# Patient Record
Sex: FEMALE | Race: WHITE
[De-identification: names, ages, dates, MRNs, and addresses within clinical notes are randomized per-mention and may not be internally consistent; named-entity substitution may affect disease eponyms.]

---

## 2018-02-28 ENCOUNTER — HOSPITAL ENCOUNTER (INPATIENT)
Dept: HOSPITAL 62 - LR | Age: 33
LOS: 3 days | Discharge: HOME | End: 2018-03-03
Attending: OBSTETRICS & GYNECOLOGY | Admitting: OBSTETRICS & GYNECOLOGY
Payer: COMMERCIAL

## 2018-02-28 ENCOUNTER — HOSPITAL ENCOUNTER (OUTPATIENT)
Dept: HOSPITAL 62 - LC | Age: 33
Discharge: HOME | End: 2018-02-28
Attending: OBSTETRICS & GYNECOLOGY
Payer: COMMERCIAL

## 2018-02-28 DIAGNOSIS — Z3A.39: ICD-10-CM

## 2018-02-28 DIAGNOSIS — O24.419: Primary | ICD-10-CM

## 2018-02-28 DIAGNOSIS — O48.0: ICD-10-CM

## 2018-02-28 DIAGNOSIS — Z3A.40: ICD-10-CM

## 2018-02-28 DIAGNOSIS — Z88.1: ICD-10-CM

## 2018-02-28 LAB
ADD MANUAL DIFF: NO
ALBUMIN SERPL-MCNC: 3.1 G/DL (ref 3.5–5)
ALP SERPL-CCNC: 99 U/L (ref 38–126)
ALT SERPL-CCNC: 36 U/L (ref 9–52)
ANION GAP SERPL CALC-SCNC: 7 MMOL/L (ref 5–19)
APPEARANCE UR: (no result)
APPEARANCE UR: (no result)
APTT PPP: YELLOW S
APTT PPP: YELLOW S
AST SERPL-CCNC: 26 U/L (ref 14–36)
BARBITURATES UR QL SCN: NEGATIVE
BARBITURATES UR QL SCN: NEGATIVE
BASOPHILS # BLD AUTO: 0 10^3/UL (ref 0–0.2)
BASOPHILS NFR BLD AUTO: 0.2 % (ref 0–2)
BILIRUB DIRECT SERPL-MCNC: 0.2 MG/DL (ref 0–0.4)
BILIRUB SERPL-MCNC: 0.3 MG/DL (ref 0.2–1.3)
BILIRUB UR QL STRIP: NEGATIVE
BILIRUB UR QL STRIP: NEGATIVE
BUN SERPL-MCNC: 7 MG/DL (ref 7–20)
CALCIUM: 8.8 MG/DL (ref 8.4–10.2)
CHLORIDE SERPL-SCNC: 108 MMOL/L (ref 98–107)
CO2 SERPL-SCNC: 22 MMOL/L (ref 22–30)
CREAT UR-MCNC: 86.3 MG/DL (ref 16–327)
EOSINOPHIL # BLD AUTO: 0 10^3/UL (ref 0–0.6)
EOSINOPHIL NFR BLD AUTO: 0 % (ref 0–6)
ERYTHROCYTE [DISTWIDTH] IN BLOOD BY AUTOMATED COUNT: 12.8 % (ref 11.5–14)
ERYTHROCYTE [DISTWIDTH] IN BLOOD BY AUTOMATED COUNT: 12.9 % (ref 11.5–14)
GLUCOSE SERPL-MCNC: 87 MG/DL (ref 75–110)
GLUCOSE UR STRIP-MCNC: NEGATIVE MG/DL
GLUCOSE UR STRIP-MCNC: NEGATIVE MG/DL
HCT VFR BLD CALC: 37.2 % (ref 36–47)
HCT VFR BLD CALC: 37.5 % (ref 36–47)
HGB BLD-MCNC: 13 G/DL (ref 12–15.5)
HGB BLD-MCNC: 13 G/DL (ref 12–15.5)
KETONES UR STRIP-MCNC: 20 MG/DL
KETONES UR STRIP-MCNC: NEGATIVE MG/DL
LDH1 SERPL-CCNC: 515 U/L (ref 313–618)
LYMPHOCYTES # BLD AUTO: 1.2 10^3/UL (ref 0.5–4.7)
LYMPHOCYTES NFR BLD AUTO: 10.7 % (ref 13–45)
MCH RBC QN AUTO: 31.3 PG (ref 27–33.4)
MCH RBC QN AUTO: 31.7 PG (ref 27–33.4)
MCHC RBC AUTO-ENTMCNC: 34.8 G/DL (ref 32–36)
MCHC RBC AUTO-ENTMCNC: 34.9 G/DL (ref 32–36)
MCV RBC AUTO: 90 FL (ref 80–97)
MCV RBC AUTO: 91 FL (ref 80–97)
METHADONE UR QL SCN: NEGATIVE
METHADONE UR QL SCN: NEGATIVE
MONOCYTES # BLD AUTO: 0.6 10^3/UL (ref 0.1–1.4)
MONOCYTES NFR BLD AUTO: 6 % (ref 3–13)
NEUTROPHILS # BLD AUTO: 9 10^3/UL (ref 1.7–8.2)
NEUTS SEG NFR BLD AUTO: 83.1 % (ref 42–78)
NITRITE UR QL STRIP: NEGATIVE
NITRITE UR QL STRIP: NEGATIVE
PCP UR QL SCN: NEGATIVE
PCP UR QL SCN: NEGATIVE
PH UR STRIP: 6 [PH] (ref 5–9)
PH UR STRIP: 6 [PH] (ref 5–9)
PLATELET # BLD: 175 10^3/UL (ref 150–450)
PLATELET # BLD: 182 10^3/UL (ref 150–450)
POTASSIUM SERPL-SCNC: 3.6 MMOL/L (ref 3.6–5)
PROT SERPL-MCNC: 5.6 G/DL (ref 6.3–8.2)
PROT UR STRIP-MCNC: 100 MG/DL
PROT UR STRIP-MCNC: 11.6 MG/DL (ref ?–12)
PROT UR STRIP-MCNC: NEGATIVE MG/DL
RBC # BLD AUTO: 4.1 10^6/UL (ref 3.72–5.28)
RBC # BLD AUTO: 4.17 10^6/UL (ref 3.72–5.28)
SODIUM SERPL-SCNC: 137.1 MMOL/L (ref 137–145)
SP GR UR STRIP: 1.01
SP GR UR STRIP: 1.01
TOTAL CELLS COUNTED % (AUTO): 100 %
UR PRO/CREAT RATIO RESULT: 0.1 MG/MG (ref 0–0.2)
URATE SERPL-MCNC: 4.3 MG/DL (ref 2.5–6.2)
URINE AMPHETAMINES SCREEN: NEGATIVE
URINE AMPHETAMINES SCREEN: NEGATIVE
URINE BENZODIAZEPINES SCREEN: NEGATIVE
URINE BENZODIAZEPINES SCREEN: NEGATIVE
URINE COCAINE SCREEN: NEGATIVE
URINE COCAINE SCREEN: NEGATIVE
URINE MARIJUANA (THC) SCREEN: NEGATIVE
URINE MARIJUANA (THC) SCREEN: NEGATIVE
UROBILINOGEN UR-MCNC: NEGATIVE MG/DL (ref ?–2)
UROBILINOGEN UR-MCNC: NEGATIVE MG/DL (ref ?–2)
WBC # BLD AUTO: 10.9 10^3/UL (ref 4–10.5)
WBC # BLD AUTO: 11 10^3/UL (ref 4–10.5)

## 2018-02-28 PROCEDURE — 81005 URINALYSIS: CPT

## 2018-02-28 PROCEDURE — 4A1HXCZ MONITORING OF PRODUCTS OF CONCEPTION, CARDIAC RATE, EXTERNAL APPROACH: ICD-10-PCS | Performed by: OBSTETRICS & GYNECOLOGY

## 2018-02-28 PROCEDURE — 85025 COMPLETE CBC W/AUTO DIFF WBC: CPT

## 2018-02-28 PROCEDURE — 86901 BLOOD TYPING SEROLOGIC RH(D): CPT

## 2018-02-28 PROCEDURE — 80053 COMPREHEN METABOLIC PANEL: CPT

## 2018-02-28 PROCEDURE — 81001 URINALYSIS AUTO W/SCOPE: CPT

## 2018-02-28 PROCEDURE — 80307 DRUG TEST PRSMV CHEM ANLYZR: CPT

## 2018-02-28 PROCEDURE — 84156 ASSAY OF PROTEIN URINE: CPT

## 2018-02-28 PROCEDURE — 36415 COLL VENOUS BLD VENIPUNCTURE: CPT

## 2018-02-28 PROCEDURE — 84550 ASSAY OF BLOOD/URIC ACID: CPT

## 2018-02-28 PROCEDURE — 94760 N-INVAS EAR/PLS OXIMETRY 1: CPT

## 2018-02-28 PROCEDURE — 85027 COMPLETE CBC AUTOMATED: CPT

## 2018-02-28 PROCEDURE — 82570 ASSAY OF URINE CREATININE: CPT

## 2018-02-28 PROCEDURE — 86900 BLOOD TYPING SEROLOGIC ABO: CPT

## 2018-02-28 PROCEDURE — 86592 SYPHILIS TEST NON-TREP QUAL: CPT

## 2018-02-28 PROCEDURE — 83615 LACTATE (LD) (LDH) ENZYME: CPT

## 2018-02-28 PROCEDURE — 59025 FETAL NON-STRESS TEST: CPT

## 2018-02-28 PROCEDURE — 86850 RBC ANTIBODY SCREEN: CPT

## 2018-02-28 NOTE — NON STRESS TEST REPORT
=================================================================

Non Stress Test

=================================================================

Datetime Report Generated by CPN: 02/28/2018 20:47

   

   

=================================================================

DEMOGRAPHIC

=================================================================

   

EGA NST:  39.6

   

=================================================================

INDICATION

=================================================================

   

Indication for Study:  Diabetes Mellitus

   

=================================================================

MONITORING

=================================================================

   

Time on Monitor:  02/28/2018 12:52

Time off Monitor:  02/28/2018 13:57

NST Duration:  65

   

=================================================================

NST INTERVENTIONS

=================================================================

   

NST Interventions:  PO Hydration; Reposition Patient

Physician Notified NST:  A.Veliz, CNM

BABY A:  [id number]

   

=================================================================

BABY A

=================================================================

   

Fetal Movement :  Present

Contraction Frequency :  x1

FHR Baseline :  130

Accelerations :  15X15

Decelerations :  None

Variability :  Moderate 6-25bpm

NST Review:  Meets Criteria for Reactive NST

NST Review and Verified By :  huang ferrara rn

NST Results:  Reactive

   

=================================================================

NST REPORT

=================================================================

   

Report Trigger:  Send Report

## 2018-03-01 RX ADMIN — IBUPROFEN SCH MG: 800 TABLET, FILM COATED ORAL at 06:17

## 2018-03-01 RX ADMIN — DOCUSATE SODIUM SCH MG: 100 CAPSULE, LIQUID FILLED ORAL at 17:49

## 2018-03-01 RX ADMIN — FAMOTIDINE SCH MG: 20 TABLET, FILM COATED ORAL at 22:05

## 2018-03-01 RX ADMIN — IBUPROFEN SCH MG: 800 TABLET, FILM COATED ORAL at 22:04

## 2018-03-01 RX ADMIN — SENNOSIDES, DOCUSATE SODIUM SCH EACH: 50; 8.6 TABLET, FILM COATED ORAL at 11:32

## 2018-03-01 RX ADMIN — DOCUSATE SODIUM SCH MG: 100 CAPSULE, LIQUID FILLED ORAL at 11:32

## 2018-03-01 RX ADMIN — IBUPROFEN SCH MG: 800 TABLET, FILM COATED ORAL at 14:53

## 2018-03-01 RX ADMIN — FERROUS SULFATE TAB 325 MG (65 MG ELEMENTAL FE) SCH MG: 325 (65 FE) TAB at 11:32

## 2018-03-01 RX ADMIN — FAMOTIDINE SCH MG: 20 TABLET, FILM COATED ORAL at 11:31

## 2018-03-01 RX ADMIN — Medication SCH CAP: at 11:31

## 2018-03-01 RX ADMIN — FERROUS SULFATE TAB 325 MG (65 MG ELEMENTAL FE) SCH MG: 325 (65 FE) TAB at 17:49

## 2018-03-01 NOTE — WARNING SIGNS IN BABIES
=================================================================

VOD Warning Signs

=================================================================

Datetime Report Generated by [facility]: 03/01/2018 07:03

   

VOD#608 -Warning Signs in Babies:  Viewed with Parent(s)/Family   

   (03/01/2018 07:02:Stacey Delcid RN)

## 2018-03-01 NOTE — ADMISSION PHYSICAL
=================================================================



=================================================================

Datetime Report Generated by CPN: 2018 12:40

   

   

=================================================================

CURRENT ADMISSION

=================================================================

   

Chief Complaint:  Uterine Contractions

Indication for Induction:  Post Dates

Indication for Induction:  Term, Intrauterine Pregnancy

Admit Plan:  Initiate Labor Protocol

   

=================================================================

ALLERGIES

=================================================================

   

Medication Allergies:  Yes

Medication Allergies:  amoxicillin/Hives (2018)

Latex:  No Latex Allergies

Food Allergies:  N/A

Environmental Allergies:  N/A

   

=================================================================

OBSTETRICAL HISTORY

=================================================================

   

EDC:  2018 00:00

:  1

Para:  0

Term:  0

:  0

SAB:  0

IAB:  0

Ectopic:  0

Livin

Cesareans:  0

VBACs:  0

Multiple Births:  0

Gestational Diabetes:  Yes

Rh Sensitization:  No

Incompetent Cervix:  No

GUILLERMO:  No

Infertility:  No

ART Treatment:  No

Uterine Anomaly:  No

IUGR:  No

Hx Previous C/S:  No

Macrosomia:  No

Hx Loss/Stillborn:  No

PIH:  No

Hx  Death:  No

Placenta Previa/Abruption:  No

Depression/PP Depression:  No

PTL/PROM:  No

Post Partum Hemorrhage:  No

Current Pregnancy Procedures:  Ultrasound; NST

Obstetrical History Comments:  G1 - Current pregnancy, GDM diet

   controlled

   

=================================================================

***SEE PRENATAL RECORDS***

=================================================================

   

Alcohol:  No

Marijuana :  No

Cocaine:  No

Other Illicit Drugs:  No

Cigarettes:  Never Smoker. [id number]

   

=================================================================

MEDICAL HISTORY

=================================================================

   

Diabetes:  Yes

Diabetes Type:  Gestational Diabetes

Blood Transfusion:  No

Pulmonary Disease (Asthma, TB):  No

Breast Disease:  No

Hypertension:  No

Gyn Surgery:  No

Heart Disease:  No

Hosp/Surgery:  Yes

Autoimmune Disorder:  No

Anesthetic Complications:  No

Kidney Disease:  No

Abnormal Pap Smear:  No

Neuro/Epilepsy:  No

Psychiatric Disorders:  No

Other Medical Diseases:  No

Hepatitis/Liver Disease:  No

Significant Family History:  No

Varicosities/Phlebitis:  No

Trauma/Violence :  No

Thyroid Dysfunction:  No

Medical History Comments:  2000 - cyst removed

   

=================================================================

INFECTIOUS HISTORY

=================================================================

   

Gonorrhea:  No

Genital Herpes:  No

Chlamydia:  No

Tuberculosis:  No

Syphilis:  No

Hepatitis:  No

HIV/AIDS Exposure:  No

Rash or Viral Illness:  No

HPV:  No

   

=================================================================

PHYSICAL EXAM

=================================================================

   

General:  Normal

HEENT:  Normal

Neurologic:  Normal

Thyroid:  Normal

Heart:  Normal

Lungs:  Normal

Breast:  Deferred

Back:  Normal

Abdomen:  Normal

Genitourinary Exam:  Normal

Extremities:  Normal

DTRs:  Normal

Pelvic Type:  Adequate

   

=================================================================

MEMBRANES

=================================================================

   

Membranes:  Ruptured

   

=================================================================

FETUS A

=================================================================

   

EGA:  40.0

Monitoring:  External US

Decelerations:  None

Fetal Presentation:  Vertex

   

=================================================================

PLANS FOR LABOR AND DELIVERY

=================================================================

   

Labor and Delivery:  None

Pain Management:  Epidural

Feeding Preference:  Breast

Benefit of Breast Feed Discussed:  Yes

Circumcision:  Yes

   

=================================================================

INFORMED CONSENT

=================================================================

   

Signature:  Electronically signed by Maxwell Salomon MD ([employer]) on

   3/1/2018 at 03:41  with User ID: [email or web address]

## 2018-03-02 LAB
ERYTHROCYTE [DISTWIDTH] IN BLOOD BY AUTOMATED COUNT: 12.9 % (ref 11.5–14)
HCT VFR BLD CALC: 29.1 % (ref 36–47)
HGB BLD-MCNC: 10 G/DL (ref 12–15.5)
MCH RBC QN AUTO: 31.7 PG (ref 27–33.4)
MCHC RBC AUTO-ENTMCNC: 34.5 G/DL (ref 32–36)
MCV RBC AUTO: 92 FL (ref 80–97)
PLATELET # BLD: 146 10^3/UL (ref 150–450)
RBC # BLD AUTO: 3.16 10^6/UL (ref 3.72–5.28)
WBC # BLD AUTO: 9.6 10^3/UL (ref 4–10.5)

## 2018-03-02 RX ADMIN — SENNOSIDES, DOCUSATE SODIUM SCH EACH: 50; 8.6 TABLET, FILM COATED ORAL at 10:03

## 2018-03-02 RX ADMIN — IBUPROFEN SCH MG: 800 TABLET, FILM COATED ORAL at 21:57

## 2018-03-02 RX ADMIN — DOCUSATE SODIUM SCH MG: 100 CAPSULE, LIQUID FILLED ORAL at 17:41

## 2018-03-02 RX ADMIN — IBUPROFEN SCH MG: 800 TABLET, FILM COATED ORAL at 06:39

## 2018-03-02 RX ADMIN — DOCUSATE SODIUM SCH MG: 100 CAPSULE, LIQUID FILLED ORAL at 10:02

## 2018-03-02 RX ADMIN — Medication SCH CAP: at 10:02

## 2018-03-02 RX ADMIN — FERROUS SULFATE TAB 325 MG (65 MG ELEMENTAL FE) SCH MG: 325 (65 FE) TAB at 17:42

## 2018-03-02 RX ADMIN — FAMOTIDINE SCH MG: 20 TABLET, FILM COATED ORAL at 10:02

## 2018-03-02 RX ADMIN — FERROUS SULFATE TAB 325 MG (65 MG ELEMENTAL FE) SCH MG: 325 (65 FE) TAB at 10:02

## 2018-03-02 RX ADMIN — FAMOTIDINE SCH MG: 20 TABLET, FILM COATED ORAL at 21:57

## 2018-03-02 RX ADMIN — IBUPROFEN SCH MG: 800 TABLET, FILM COATED ORAL at 15:22

## 2018-03-02 NOTE — PDOC PROGRESS REPORT
Subjective-OB


Progress Note for:: 03/02/18





Physical Exam (OB)


Vital Signs: 


 











Temp Pulse Resp BP Pulse Ox


 


 98.3 F   82   19   130/83 H  97 


 


 03/01/18 19:38  03/01/18 19:38  03/01/18 19:38  03/01/18 19:38  03/01/18 19:38








 Intake & Output











 03/01/18 03/02/18 03/03/18





 06:59 06:59 06:59


 


Intake Total  840 


 


Balance  840 


 


Weight 84.7 kg  














- PIH/Pre-Eclampsia


Clonus: Negative


Headache: Absent


Epigastric Pain: No


Visual Changes: No





- Lochia


Lochia Amount: Small 10-25 ml


Lochia Color: Rubra/Red





- Abdomen


Description: Soft, Round


Hernia Present: No


Bowel Sounds: Normoactive


Flatus Presence: Present


Stool: No


Fundal Description: Firm


Fundal Height: u/u - u/2





Objective-Diagnostic


Laboratory: 


 





 03/02/18 07:58 





 











  03/02/18





  07:58


 


WBC  9.6


 


RBC  3.16 L


 


Hgb  10.0 L D


 


Hct  29.1 L


 


MCV  92


 


MCH  31.7


 


MCHC  34.5


 


RDW  12.9


 


Plt Count  146 L

## 2018-03-03 VITALS — DIASTOLIC BLOOD PRESSURE: 71 MMHG | SYSTOLIC BLOOD PRESSURE: 125 MMHG

## 2018-03-03 RX ADMIN — SENNOSIDES, DOCUSATE SODIUM SCH EACH: 50; 8.6 TABLET, FILM COATED ORAL at 10:00

## 2018-03-03 RX ADMIN — FAMOTIDINE SCH MG: 20 TABLET, FILM COATED ORAL at 09:59

## 2018-03-03 RX ADMIN — IBUPROFEN SCH MG: 800 TABLET, FILM COATED ORAL at 06:01

## 2018-03-03 RX ADMIN — DOCUSATE SODIUM SCH MG: 100 CAPSULE, LIQUID FILLED ORAL at 09:59

## 2018-03-03 RX ADMIN — FERROUS SULFATE TAB 325 MG (65 MG ELEMENTAL FE) SCH MG: 325 (65 FE) TAB at 10:00

## 2018-03-03 RX ADMIN — IBUPROFEN SCH MG: 800 TABLET, FILM COATED ORAL at 13:39

## 2018-03-03 RX ADMIN — Medication SCH CAP: at 09:59

## 2018-03-03 NOTE — PDOC DISCHARGE SUMMARY
Final Diagnosis


Discharge Date: 18





- Final Diagnosis


(1) Delivery normal


Is this a current diagnosis for this admission?: Yes   





(2) GDM (gestational diabetes mellitus)


Is this a current diagnosis for this admission?: Yes   





(3) Pregnancy


Is this a current diagnosis for this admission?: Yes   





Discharge Data





- Discharge Medication


Prescriptions: 


Docusate Sodium [Colace 100 mg Capsule] 100 mg PO BID #30 capsule


Ferrous Sulfate [Feosol 325 mg Tablet] 325 mg PO BID #60 tablet


Home Medications: 








Prenatal Vit/Dha [Prenatal Multi + Dha Capsule] 1 cap PO DAILY 18 


Docusate Sodium [Colace 100 mg Capsule] 100 mg PO BID #30 capsule 18 


Ferrous Sulfate [Feosol 325 mg Tablet] 325 mg PO BID #60 tablet 18 








Gestational Age: 40.0 wks


Reason(s) for Admission: Onset of Labor


Intrapartum Procedure(s): Spontaneous Vaginal Delivery


Postpartum Complication(s): Laceration-Vaginal


Laceration-Degree: 1st





-  Data


  ** Baby 1 Male


Apgar at 1 minute: 9


Apgar at 5 minutes: 9


Weight: 3.289 kg


Home with Mother: Yes


Complications: No





- Diagnosis Test


Laboratory: 


 











Temp Pulse Resp BP Pulse Ox


 


 98.5 F   81   18   125/71   99 


 


 18 07:45  18 07:45  18 07:45  18 07:45  18 07:45








 











  18





  21:30 21:51 07:58


 


RBC   4.10  3.16 L


 


Hgb   13.0  10.0 L D


 


Hct   37.2  29.1 L


 


Urine Opiates Screen  NEGATIVE  














- Discharge information/Instructions


Discharge Activity: Activity As Tolerated, Balance Activity w/Rest, Pelvic Rest

, Slowly Increase Activity, No tub bath


Discharge Diet: Regular


Disposition: HOME, SELF-CARE


Follow up with: [facility]


in: 4, Weeks

## 2018-03-03 NOTE — PDOC PROGRESS REPORT
Subjective-OB


Progress Note for:: 03/03/18


Subjective: 





Ready for discharge.





Physical Exam (OB)


Vital Signs: 


 











Temp Pulse Resp BP Pulse Ox


 


 98.5 F   81   18   125/71   99 


 


 03/03/18 07:45  03/03/18 07:45  03/03/18 07:45  03/03/18 07:45  03/03/18 07:45








 Intake & Output











 03/02/18 03/03/18 03/04/18





 06:59 06:59 06:59


 


Intake Total 840 600 


 


Balance 840 600 














- PIH/Pre-Eclampsia


Clonus: Negative


Headache: Absent


Epigastric Pain: No


Visual Changes: No





- Lochia


Lochia Amount: Scant < 10 ml


Lochia Color: Rubra/Red





- Abdomen


Description: Soft, Flat


Hernia Present: Yes


Bowel Sounds: Normoactive


Flatus Presence: Present


Stool: No


Fundal Description: Firm, Midline


Fundal Height: u/u - u/2





Objective-Diagnostic


Laboratory: 


 





 03/02/18 07:58

## 2018-03-15 NOTE — DELIVERY SUMMARY
=================================================================

Del Sum A-C

=================================================================

Datetime Report Generated by CPN: 03/15/ 15:28

   

   

=================================================================

DELIVERY PERSONNEL

=================================================================

   

DELIVERY PERSONNEL:  [id number]

Delivery Doctor::  Maxwell Salomon MD

Labor and Delivery Nurse::  Stacey Delcid RN

Labor and Delivery Nurse::  Yaritza Ramirez RN

Scrub Tech/CNA:  Niesha Kirkpatrick ST

Additional Personnel: :  Trinh Burnett RN

   

=================================================================

MATERNAL INFORMATION

=================================================================

   

Delivery Anesthesia:  Epidural

Medications After Delivery:  Pitocin Drip 20 Units/1000ml NSS

Maternal Complications:  None

   

=================================================================

LABOR SUMMARY

=================================================================

   

EDC:  2018 00:00

No. Babies in Womb:  1

 Attempted:  No

Labor Anesthesia:  Epidural

   

=================================================================

LABOR INFORMATION

=================================================================

   

Reason for Induction:  Post Dates; Maternal Diabetes

Onset of Labor:  2018 23:30

Complete Dilatation:  2018 02:31

Cervical Ripening Agents:  Cervidil

Oxytocin:  Augmentation

Group B Beta Strep:  Negative

Antibiotics # of Doses:  0

Steroids Given:  None

Reason Steroids Not Administered:  Not Applicable

   

=================================================================

MEMBRANES

=================================================================

   

Membranes Rupture Method:  Spontaneous

Rupture of Membranes:  2018 20:30

Length of Rupture (hr):  7.00

Amniotic Fluid Color:  Clear

Amniotic Fluid Amount:  Moderate

Amniotic Fluid Odor:  Normal

   

=================================================================

STAGES OF LABOR

=================================================================

   

Stage 1 hr:  3

Stage 1 min:  1

Stage 2 hr:  0

Stage 2 min:  59

Stage 3 hr:  0

Stage 3 min:  5

Total Time in Labor hr:  4

Total Time in Labor min:  5

   

=================================================================

VAGINAL DELIVERY

=================================================================

   

Episiotomy:  None

Laceration #1:  Vaginal

Laceration Extension #1:  First Degree

Laceration Repair:  Yes

Laceration Repair Note:  repaired with 2-0 vicryl

Sponge Count Correct:  N/A

Sharps Count Correct:  Yes

   

=================================================================

CSECTION DELIVERY

=================================================================

   

Primary Indication:  N/A

Secondary Indication:  N/A

CSection Incidence:  N/A

Labor:  N/A

Elective:  N/A

CSection Incision:  N/A

   

=================================================================

BABY A INFORMATION

=================================================================

   

Infant Delivery Date/Time:  2018 03:30

Method of Delivery:  Vaginal

Method of Delivery:  Vaginal

Born in Route :  No

:  N/A

Forceps:  N/A

Vacuum Extraction:  N/A

Shoulder Dystocia :  No

   

=================================================================

PRESENTATION/POSITION BABY A

=================================================================

   

Presentation:  Cephalic

Cephalic Presentation:  Vertex

Vertex Position:  Right Occipital Anterior

Breech Presentation:  N/A

   

=================================================================

PLACENTA INFORMATION BABY A

=================================================================

   

Placenta Delivery Time :  2018 03:35

Placenta Method of Delivery:  Spontaneous

Placenta Status:  Delivered

   

=================================================================

APGAR SCORES BABY A

=================================================================

   

Heart Rate 1 min:  >100 bpm

Resp Effort 1 min:  Good Cry

Reflex Irritability 1 min:  Cough or Sneeze or Pulls Away

Muscle Tone 1 min:  Active Motion

Color 1 min:  Body Pink, Extremities Blue

Resuscitation Effort 1 min:  N/A

APGAR SCORE 1 MIN:  9

Heart Rate 5 min:  >100 bpm

Resp Effort 5 min:  Good Cry

Reflex Irritability 5 min:  Cough or Sneeze or Pulls Away

Muscle Tone 5 min:  Active Motion

Color 5 min:  Body Pink, Extremities Blue

Resuscitation Effort 5 min:  N/A

APGAR SCORE 5 MIN:  9

   

=================================================================

INFANT INFORMATION BABY A

=================================================================

   

Gestational Age at Delivery:  40.0

Gestational Status:  Full Term- 39- 40.6 Weeks

Infant Outcome :  Liveborn

Infant Condition :  Stable

Infant Sex:  Male

Infant Sex:  Male

   

=================================================================

IDENTIFICATION BABY A

=================================================================

   

Infant Verification Date/Time:  2018 03:51

ID Band Number:  [id number]

Mother's Name Verified:  Yes

Infant Medical Record Number:  [id number]

RN Verifying Infant:  SRashard Vasquez, RNC _ K. James, RN

   

=================================================================

WEIGHT/LENGTH BABY A

=================================================================

   

Infant Birthweight (gm):  3290

Infant Weight (lb):  7

Infant Weight (oz):  4

Infant Length (in):  20.75

Infant Length (cm):  52.71

   

=================================================================

CORD INFORMATION BABY A

=================================================================

   

No. Cord Vessels:  3

Nuchal Cord :  Around Neck x1, Loose

Cord Blood Taken:  Yes-For Eval (Mom's Blood Type - or O+)

Cord Blood Taken:  Yes-For Eval (Mom's Blood Type - or O+)

Infant Suction:  Mouth; Nose

   

=================================================================

ASSESSMENT BABY A

=================================================================

   

Infant Complications:  None

Physical Findings at Delivery:  Molding of the Head

Physical Findings- Other:  nuchal cord x 1

Infant Respirations:  Appears Normal

Skin to Skin:  Yes

Skin to Skin Time (min):  30

Neonatologist/ALS Called :  No

Infant Care By:  CHRISTOPHER Ramirez RN

Transferred To:  Remains with Mother

   

=================================================================

BABY B INFORMATION

=================================================================

   

 :  N/A

   

=================================================================

SIGNATURES

=================================================================

   

Signature:  Electronically signed by Maxwell Salomon MD ([email or web address]) on

   3/1/2018 at 03:43  with User ID: [email or web address]